# Patient Record
Sex: FEMALE | Race: WHITE | URBAN - METROPOLITAN AREA
[De-identification: names, ages, dates, MRNs, and addresses within clinical notes are randomized per-mention and may not be internally consistent; named-entity substitution may affect disease eponyms.]

---

## 2017-09-14 ENCOUNTER — TELEPHONE (OUTPATIENT)
Dept: ORTHOPEDIC SURGERY | Age: 47
End: 2017-09-14

## 2017-09-14 ENCOUNTER — OFFICE VISIT (OUTPATIENT)
Dept: ORTHOPEDIC SURGERY | Age: 47
End: 2017-09-14

## 2017-09-14 VITALS
SYSTOLIC BLOOD PRESSURE: 152 MMHG | BODY MASS INDEX: 23.02 KG/M2 | HEART RATE: 96 BPM | HEIGHT: 68 IN | WEIGHT: 151.9 LBS | DIASTOLIC BLOOD PRESSURE: 99 MMHG

## 2017-09-14 DIAGNOSIS — S93.402A SPRAIN OF LEFT ANKLE, UNSPECIFIED LIGAMENT, INITIAL ENCOUNTER: ICD-10-CM

## 2017-09-14 DIAGNOSIS — M79.672 LEFT FOOT PAIN: Primary | ICD-10-CM

## 2017-09-14 DIAGNOSIS — Z72.0 TOBACCO ABUSE: ICD-10-CM

## 2017-09-14 DIAGNOSIS — S92.355A CLOSED NONDISPLACED FRACTURE OF FIFTH METATARSAL BONE OF LEFT FOOT, INITIAL ENCOUNTER: ICD-10-CM

## 2017-09-14 DIAGNOSIS — S93.602A FOOT SPRAIN, LEFT, INITIAL ENCOUNTER: ICD-10-CM

## 2017-09-14 PROBLEM — S93.609A FOOT SPRAIN: Status: ACTIVE | Noted: 2017-09-14

## 2017-09-14 PROCEDURE — 99203 OFFICE O/P NEW LOW 30 MIN: CPT | Performed by: FAMILY MEDICINE

## 2017-09-14 PROCEDURE — 29405 APPL SHORT LEG CAST: CPT | Performed by: FAMILY MEDICINE

## 2017-09-14 PROCEDURE — L3260 AMBULATORY SURGICAL BOOT EAC: HCPCS | Performed by: FAMILY MEDICINE

## 2017-09-14 RX ORDER — TRAMADOL HYDROCHLORIDE 50 MG/1
TABLET ORAL
Qty: 60 TABLET | Refills: 0 | Status: SHIPPED | OUTPATIENT
Start: 2017-09-14

## 2017-09-14 RX ORDER — IBUPROFEN 800 MG/1
800 TABLET ORAL 3 TIMES DAILY
Qty: 90 TABLET | Refills: 3 | Status: SHIPPED | OUTPATIENT
Start: 2017-09-14

## 2017-09-28 ENCOUNTER — OFFICE VISIT (OUTPATIENT)
Dept: ORTHOPEDIC SURGERY | Age: 47
End: 2017-09-28

## 2017-09-28 VITALS
SYSTOLIC BLOOD PRESSURE: 130 MMHG | HEART RATE: 94 BPM | BODY MASS INDEX: 23.02 KG/M2 | DIASTOLIC BLOOD PRESSURE: 79 MMHG | HEIGHT: 68 IN | WEIGHT: 151.9 LBS

## 2017-09-28 DIAGNOSIS — S93.402D SPRAIN OF LEFT ANKLE, UNSPECIFIED LIGAMENT, SUBSEQUENT ENCOUNTER: ICD-10-CM

## 2017-09-28 DIAGNOSIS — S92.355A CLOSED NONDISPLACED FRACTURE OF FIFTH METATARSAL BONE OF LEFT FOOT, INITIAL ENCOUNTER: ICD-10-CM

## 2017-09-28 DIAGNOSIS — S93.602D FOOT SPRAIN, LEFT, SUBSEQUENT ENCOUNTER: ICD-10-CM

## 2017-09-28 DIAGNOSIS — Z72.0 TOBACCO ABUSE: ICD-10-CM

## 2017-09-28 DIAGNOSIS — M79.672 LEFT FOOT PAIN: Primary | ICD-10-CM

## 2017-09-28 PROCEDURE — 99213 OFFICE O/P EST LOW 20 MIN: CPT | Performed by: FAMILY MEDICINE

## 2017-09-28 PROCEDURE — 26600 TREAT METACARPAL FRACTURE: CPT | Performed by: FAMILY MEDICINE

## 2017-09-28 RX ORDER — IBUPROFEN 200 MG
800 TABLET ORAL EVERY 6 HOURS PRN
Qty: 90 TABLET | Refills: 0 | Status: SHIPPED | OUTPATIENT
Start: 2017-09-28 | End: 2017-10-19 | Stop reason: SDUPTHER

## 2017-09-28 RX ORDER — TRAMADOL HYDROCHLORIDE 50 MG/1
50 TABLET ORAL EVERY 6 HOURS PRN
Qty: 30 TABLET | Refills: 0 | Status: SHIPPED | OUTPATIENT
Start: 2017-09-28 | End: 2017-10-08

## 2017-10-05 ENCOUNTER — OFFICE VISIT (OUTPATIENT)
Dept: ORTHOPEDIC SURGERY | Age: 47
End: 2017-10-05

## 2017-10-05 DIAGNOSIS — S93.402D SPRAIN OF LEFT ANKLE, UNSPECIFIED LIGAMENT, SUBSEQUENT ENCOUNTER: ICD-10-CM

## 2017-10-05 DIAGNOSIS — M79.672 LEFT FOOT PAIN: Primary | ICD-10-CM

## 2017-10-05 DIAGNOSIS — Z72.0 TOBACCO ABUSE: ICD-10-CM

## 2017-10-05 DIAGNOSIS — S92.355D CLOSED NONDISPLACED FRACTURE OF FIFTH METATARSAL BONE OF LEFT FOOT WITH ROUTINE HEALING, SUBSEQUENT ENCOUNTER: ICD-10-CM

## 2017-10-05 PROBLEM — S92.309A CLOSED FRACTURE OF METATARSAL BONE: Status: ACTIVE | Noted: 2017-10-05

## 2017-10-05 PROCEDURE — 29425 APPL SHORT LEG CAST WALKING: CPT | Performed by: FAMILY MEDICINE

## 2017-10-05 PROCEDURE — 99024 POSTOP FOLLOW-UP VISIT: CPT | Performed by: FAMILY MEDICINE

## 2017-10-05 NOTE — MR AVS SNAPSHOT
After Visit Summary             Erin Anderson   10/5/2017 3:15 PM   Appointment    Description:  Female : 1970   Provider:  Amee Arora MD   Department:  [facility] [address]               Your Follow-Up and Future Appointments         Below is a list of your follow-up and future appointments. This may not be a complete list as you may have made appointments directly with providers that we are not aware of or your providers may have made some for you. Please call your providers to confirm appointments. It is important to keep your appointments. Please bring your current insurance card, photo ID, co-pay, and all medication bottles to your appointment. If self-pay, payment is expected at the time of service. Your To-Do List     Future Appointments Provider Department Dept Phone    10/19/2017 1:45 PM Amee Arora MD [facility]  [phone]    Please arrive 15 minutes prior to appointment, bring photo ID and insurance card. Information from Your Visit        Department     Name Address Phone Fax    [address] [place] [address] [address]  [address] [place] [address] [phone]      Vital Signs     Last Menstrual Period Smoking Status                2017 Current Every Day Smoker             Medications and Orders      Your Current Medications Are              traMADol (ULTRAM) 50 MG tablet Take 1 tablet by mouth every 6 hours as needed for Pain    ibuprofen (ADVIL) 200 MG tablet Take 4 tablets by mouth every 6 hours as needed for Pain    ibuprofen (ADVIL;MOTRIN) 800 MG tablet Take 1 tablet by mouth 3 times daily    traMADol (ULTRAM) 50 MG tablet TAKE 1 TABLET BY MOUTH EVERY 6-8 HOURS AS NEEDED FOR PAIN. Misc.  Devices MISC ROLL A BOUT      Allergies              Codeine     Tylenol [Acetaminophen]     HURTS MY STOMACH         Additional Information        Basic Information     Date Of Birth Sex Race Ethnicity Preferred Language later time if you forget your password. 8. Enter your e-mail address. You will receive e-mail notification when new information is available in [address] 9. Click Sign Up. You can now view your medical record. Additional Information  If you have questions, please contact the physician practice where you receive care. Remember, [email or web address] is NOT to be used for urgent needs. For medical emergencies, dial 911. For questions regarding your [email or web address] account call [phone]. If you have a clinical question, please call your doctor's office.

## 2017-10-05 NOTE — PROGRESS NOTES
fracture healing were discussed for 5-7 minutes. She will need 3 view foot films through the cast next week and we will get her roll about. This dictation was performed with a verbal recognition program (DRAGON) and it was checked for errors. It is possible that there are still dictated errors within this office note. If so, please bring any errors to my attention for an addendum. All efforts were made to ensure that this office note is accurate.

## 2017-10-19 ENCOUNTER — TELEPHONE (OUTPATIENT)
Dept: ORTHOPEDIC SURGERY | Age: 47
End: 2017-10-19

## 2017-10-19 ENCOUNTER — OFFICE VISIT (OUTPATIENT)
Dept: ORTHOPEDIC SURGERY | Age: 47
End: 2017-10-19

## 2017-10-19 VITALS
HEART RATE: 67 BPM | BODY MASS INDEX: 23.49 KG/M2 | DIASTOLIC BLOOD PRESSURE: 80 MMHG | SYSTOLIC BLOOD PRESSURE: 132 MMHG | WEIGHT: 155 LBS | HEIGHT: 68 IN

## 2017-10-19 DIAGNOSIS — S93.402D SPRAIN OF LEFT ANKLE, UNSPECIFIED LIGAMENT, SUBSEQUENT ENCOUNTER: ICD-10-CM

## 2017-10-19 DIAGNOSIS — S92.355A CLOSED NONDISPLACED FRACTURE OF FIFTH METATARSAL BONE OF LEFT FOOT, INITIAL ENCOUNTER: Primary | ICD-10-CM

## 2017-10-19 DIAGNOSIS — M79.672 LEFT FOOT PAIN: ICD-10-CM

## 2017-10-19 PROCEDURE — 99024 POSTOP FOLLOW-UP VISIT: CPT | Performed by: FAMILY MEDICINE

## 2017-10-19 PROCEDURE — L1902 AFO ANKLE GAUNTLET PRE OTS: HCPCS | Performed by: FAMILY MEDICINE

## 2017-10-19 NOTE — PROGRESS NOTES
Chief Complaint  Foot Pain (follow up on left foot, cast off and x-rays today)    Follow-up left lateral midfoot and ankle pain status post inversion left 5th metatarsal base fracture     History of Present Illness:  Harika Felix is a 49 y.o. female, who is a white female one pack per day smoker who does take care and is raising her 3 grandchildren with the youngest being 3years old he should have DrWero Frye is being seen today upon referral from her severe evaluation of an acute injury to her left foot and ankle. Apparently on 9/5/2017 she was walking and stepped on an uneven portion of concrete sustaining a rotational inversion injury about her left foot and ankle. There is a palpable time the injury followed by progressive worsening of pain and swelling. She initially treated herself with icing and ibuprofen but due to persistence of pain and swelling and inability to bear weight, she was seen at [facility] on 9/6/2017. X-rays did reveal a comminuted fracture to the base of the left 5th metatarsal.  She was placed in a posterior splint and given crutches. She was given ibuprofen , was a little bit cautious on taking tramadol given her history of sensitivities to codeine which causes a rash and GI upset. She has had pain ranges between a 3-4/10 at rest with sharper 7-8/10 pain with attempting to bear weight. She is having difficulty utilizing crutches. She is having mature for pain to the lateral mid foot but also pain to the lateral aspect of the ankle. She has been attempting to ice and elevate and rest as much as she can. Denies locking catching instability or previous history of injury. She is seen today for orthopedic and sports consultation. She was seen in the office on 9/14/2017 was placed in a short-leg walking cast for her left midfoot and ankle sprain and 5th metatarsal base comminuted fracture.   She has had a very difficult time taking care of her grandchildren limiting her weightbearing does continue to smoke. Overall she would rate her improvement about 40%. She is able to bear weight without crutches and cast.  Denies locking catching or distal moderate symptoms. She does take her ibuprofen but still has to take tramadol occasionally at night. She is here for an x-ray check. She is now 23 days out from her injury. Her cast has become loose and she did get up but several days ago    She was seen in the office on 9/28/2017 was continued in her short-leg walking cast.  Overall she is feeling somewhat better but got her cast wet and the heel of her cast has been breaking down and coming soft. She is a very difficult time remaining nonweightbearing and does continue to smoke. She would rate her improvement about 50%. She has been taking her ibuprofen but is cutting back on her tramadol. She is now 30 days out from her injury and does have a scheduled appointment next week. She was seen in the office on 10/5/2017 is now 6 Weeks out from her left foot and ankle sprain with 5th metatarsal base fracture. She is doing much better at this point. She is 90% improved and does have the expected post mobilization stiffness and weakness. Denies locking or catching. She is not having rest or night pain. She has been attempting to minimize her bearing when she has had a foot her foot she is doing recently well. She is taking ibuprofen only episodically. Medical History  Patient's medications, allergies, past medical, surgical, social and family histories were reviewed and updated as appropriate. Review of Systems  Pertinent items are noted in HPI  Review of systems reviewed from Patient History Form dated on 9/14/2017 and available in the patient's chart under the Media tab.      Vital Signs  Vitals:    10/19/17 1402   BP: 132/80   Pulse: 67       General Exam:     Constitutional: Patient is adequately groomed with no evidence of malnutrition  DTRs: Deep tendon reflexes are intact  Mental Status: The patient is oriented to time, place and person. The patient's mood and affect are appropriate. Lymphatic: The lymphatic examination bilaterally reveals all areas to be without enlargement or induration. Vascular: Examination reveals no swelling or calf tenderness. Peripheral pulses are palpable and 2+. Neurological: The patient has good coordination. There is no weakness or sensory deficit. Foot  Examination  Inspection:  There is no high-grade deformity she does have resolving lateral midfoot and ankle swelling with resolving lateral ecchymosis. Palpation:  She Does not have substantial clinical tenderness over the base of the 5th metatarsal as well as over the peroneal brevis tendon. She has no further lateral tarsometatarsal and low-grade Lisfranc tenderness. Lateral ligamentous ankle tenderness also noted. Rang of Motion:  30 % reduction and foot motion. Strength:  She does have 4 out of 5 strength loss with resisted eversion and dorsiflexion. Special Tests:  Negative special testing. No evidence of peroneal subluxation. Skin: There are no rashes, ulcerations or lesions. Gait: She was able to bear weight out of her cast without substantial pain. .     Reflex symmetrically preserved. Additional Comments: Left ankle evaluation does reveal tenderness over the Machado ligament. There is trace residual swelling laterally with lateral ecchymosis. No osseous tenderness. She has about a 50% reduction of ankle motion and weakness with eversion dorsiflexion 4 minus out of 5 secondary to pain at the ankle but primarily pain involving the base of the left 5th metatarsal at her fracture site. Negative anterior drawer talar tilt and tonsils testing. No medial tenderness. No Lisfranc tenderness. Additional Examinations:  Contralateral Exam: Examination of the right ankle reveals intact skin. There is no focal tenderness or swelling.   The patient demonstrates therapy. This dictation was performed with a verbal recognition program (DRAGON) and it was checked for errors. It is possible that there are still dictated errors within this office note. If so, please bring any errors to my attention for an addendum. All efforts were made to ensure that this office note is accurate.

## 2017-11-08 ENCOUNTER — HOSPITAL ENCOUNTER (OUTPATIENT)
Dept: OTHER | Age: 47
Discharge: HOME | End: 2017-11-30
Attending: FAMILY MEDICINE | Admitting: FAMILY MEDICINE

## 2017-12-01 ENCOUNTER — HOSPITAL ENCOUNTER (OUTPATIENT)
Dept: OTHER | Age: 47
Discharge: HOME | End: 2017-12-31
Attending: FAMILY MEDICINE | Admitting: FAMILY MEDICINE

## 2023-09-18 PROBLEM — R00.2 PALPITATIONS: Status: ACTIVE | Noted: 2023-09-18

## 2023-10-31 DIAGNOSIS — R76.8 HEPATITIS C ANTIBODY TEST POSITIVE: ICD-10-CM

## 2023-11-03 LAB
HCV RNA SERPL NAA+PROBE-ACNC: ABNORMAL IU/ML
HCV RNA SERPL NAA+PROBE-LOG IU: 6.94 LOG IU/ML
HCV RNA SERPL QL NAA+PROBE: DETECTED

## 2024-02-20 ENCOUNTER — HOSPITAL ENCOUNTER (OUTPATIENT)
Dept: CT IMAGING | Age: 54
Discharge: HOME | End: 2024-02-20
Payer: MEDICAID

## 2024-02-20 ENCOUNTER — HOSPITAL ENCOUNTER (OUTPATIENT)
Dept: WOMENS IMAGING | Age: 54
Discharge: HOME | End: 2024-02-20
Payer: MEDICAID

## 2024-02-20 VITALS — HEIGHT: 67 IN | BODY MASS INDEX: 25.9 KG/M2 | WEIGHT: 165 LBS

## 2024-02-20 DIAGNOSIS — Z87.891 PERSONAL HISTORY OF TOBACCO USE: ICD-10-CM

## 2024-02-20 DIAGNOSIS — Z12.31 ENCOUNTER FOR SCREENING MAMMOGRAM FOR MALIGNANT NEOPLASM OF BREAST: ICD-10-CM

## 2024-02-20 DIAGNOSIS — Z00.00 ANNUAL PHYSICAL EXAM: ICD-10-CM

## 2024-02-20 LAB
ALBUMIN SERPL-MCNC: 4.1 G/DL (ref 3.4–5)
ALBUMIN/GLOB SERPL: 1.2 {RATIO} (ref 1.1–2.2)
ALP SERPL-CCNC: 93 U/L (ref 40–129)
ALT SERPL-CCNC: 26 U/L (ref 10–40)
ANION GAP SERPL CALCULATED.3IONS-SCNC: 11 MMOL/L (ref 3–16)
AST SERPL-CCNC: 29 U/L (ref 15–37)
BILIRUB SERPL-MCNC: 0.4 MG/DL (ref 0–1)
BUN SERPL-MCNC: 13 MG/DL (ref 7–20)
CALCIUM SERPL-MCNC: 9.7 MG/DL (ref 8.3–10.6)
CHLORIDE SERPL-SCNC: 100 MMOL/L (ref 99–110)
CO2 SERPL-SCNC: 28 MMOL/L (ref 21–32)
CREAT SERPL-MCNC: 0.7 MG/DL (ref 0.6–1.1)
DEPRECATED RDW RBC AUTO: 13.4 % (ref 12.4–15.4)
GFR SERPLBLD CREATININE-BSD FMLA CKD-EPI: >60 ML/MIN/{1.73_M2}
GLUCOSE SERPL-MCNC: 95 MG/DL (ref 70–99)
HCT VFR BLD AUTO: 44.2 % (ref 36–48)
HGB BLD-MCNC: 15.3 G/DL (ref 12–16)
MCH RBC QN AUTO: 29.2 PG (ref 26–34)
MCHC RBC AUTO-ENTMCNC: 34.6 G/DL (ref 31–36)
MCV RBC AUTO: 84.4 FL (ref 80–100)
PLATELET # BLD AUTO: 255 K/UL (ref 135–450)
PMV BLD AUTO: 8.5 FL (ref 5–10.5)
POTASSIUM SERPL-SCNC: 4.4 MMOL/L (ref 3.5–5.1)
PROT SERPL-MCNC: 7.5 G/DL (ref 6.4–8.2)
RBC # BLD AUTO: 5.23 M/UL (ref 4–5.2)
SODIUM SERPL-SCNC: 139 MMOL/L (ref 136–145)
WBC # BLD AUTO: 7.3 K/UL (ref 4–11)

## 2024-02-20 PROCEDURE — 77063 BREAST TOMOSYNTHESIS BI: CPT

## 2024-02-20 PROCEDURE — 71271 CT THORAX LUNG CANCER SCR C-: CPT

## 2024-02-21 LAB
HAV AB SER QL IA: POSITIVE
HBV CORE AB SERPL QL IA: NEGATIVE
HBV CORE IGM SERPL QL IA: NORMAL
HBV SURFACE AB SERPL IA-ACNC: <3.5 MIU/ML
HBV SURFACE AG SERPL QL IA: NORMAL
HCV AB SERPL QL IA: REACTIVE
HIV 1+2 AB+HIV1 P24 AG SERPL QL IA: NORMAL
HIV 2 AB SERPL QL IA: NORMAL
HIV1 AB SERPL QL IA: NORMAL
HIV1 P24 AG SERPL QL IA: NORMAL

## 2024-02-23 LAB
HCV RNA SERPL NAA+PROBE-ACNC: ABNORMAL IU/ML
HCV RNA SERPL NAA+PROBE-LOG IU: 7.33 LOG IU/ML
HCV RNA SERPL QL NAA+PROBE: DETECTED

## 2024-04-29 PROBLEM — S93.402A SPRAIN OF LEFT ANKLE: Status: RESOLVED | Noted: 2017-09-14 | Resolved: 2024-04-29

## 2024-04-29 PROBLEM — K21.9 MILD ACID REFLUX: Status: ACTIVE | Noted: 2024-04-29

## 2024-04-29 PROBLEM — S92.355A CLOSED NONDISPLACED FRACTURE OF FIFTH METATARSAL BONE OF LEFT FOOT: Status: RESOLVED | Noted: 2017-09-14 | Resolved: 2024-04-29

## 2024-04-29 PROBLEM — R91.1 PULMONARY NODULE: Status: ACTIVE | Noted: 2024-04-29

## 2024-04-29 PROBLEM — S92.309A CLOSED FRACTURE OF METATARSAL BONE: Status: RESOLVED | Noted: 2017-10-05 | Resolved: 2024-04-29

## 2024-04-29 PROBLEM — M79.672 LEFT FOOT PAIN: Status: RESOLVED | Noted: 2017-09-14 | Resolved: 2024-04-29

## 2024-04-29 PROBLEM — J43.9 PULMONARY EMPHYSEMA (HCC): Status: ACTIVE | Noted: 2024-04-29

## 2024-04-29 PROBLEM — J30.2 SEASONAL ALLERGIES: Status: ACTIVE | Noted: 2024-04-29

## 2024-04-29 PROBLEM — F51.01 PRIMARY INSOMNIA: Status: ACTIVE | Noted: 2024-04-29

## 2024-05-20 ENCOUNTER — HOSPITAL ENCOUNTER (EMERGENCY)
Age: 54
Discharge: HOME | End: 2024-05-20
Attending: EMERGENCY MEDICINE
Payer: MEDICAID

## 2024-05-20 VITALS
WEIGHT: 172.62 LBS | TEMPERATURE: 98.8 F | RESPIRATION RATE: 24 BRPM | SYSTOLIC BLOOD PRESSURE: 141 MMHG | BODY MASS INDEX: 27.09 KG/M2 | DIASTOLIC BLOOD PRESSURE: 83 MMHG | HEART RATE: 105 BPM | HEIGHT: 67 IN | OXYGEN SATURATION: 95 %

## 2024-05-20 DIAGNOSIS — K04.7 PERIAPICAL ABSCESS WITHOUT SINUS TRACT: Primary | ICD-10-CM

## 2024-05-20 PROCEDURE — 99283 EMERGENCY DEPT VISIT LOW MDM: CPT

## 2024-05-20 RX ORDER — PENICILLIN V POTASSIUM 500 MG/1
500 TABLET ORAL 4 TIMES DAILY
Qty: 40 TABLET | Refills: 0 | Status: SHIPPED | OUTPATIENT
Start: 2024-05-20 | End: 2024-05-30

## 2024-05-20 ASSESSMENT — PAIN - FUNCTIONAL ASSESSMENT
PAIN_FUNCTIONAL_ASSESSMENT: 0-10
PAIN_FUNCTIONAL_ASSESSMENT: 0-10

## 2024-05-20 ASSESSMENT — PAIN DESCRIPTION - ORIENTATION: ORIENTATION: LEFT;UPPER

## 2024-05-20 ASSESSMENT — PAIN SCALES - GENERAL
PAINLEVEL_OUTOF10: 4
PAINLEVEL_OUTOF10: 4

## 2024-05-20 ASSESSMENT — PAIN DESCRIPTION - DESCRIPTORS: DESCRIPTORS: ACHING

## 2024-05-20 ASSESSMENT — PAIN DESCRIPTION - LOCATION: LOCATION: TEETH

## 2024-05-20 NOTE — ED TRIAGE NOTES
Pt. C/o abscess left upper tooth onset Friday, has appointment 6/4/24 with dentist, swelling left jaw

## 2024-05-20 NOTE — DISCHARGE INSTRUCTIONS
1.  Medications as directed and Tylenol or ibuprofen over-the-counter for pain.  2 You can use ice packs for the facial swelling  #3 follow-up with dentist or oral surgeon as soon as possible.  4.  Return emergency department for severe high fever severe signs of infection or inability to tolerate oral fluid

## 2024-05-31 NOTE — ED PROVIDER NOTES
EMERGENCY DEPARTMENT ENCOUNTER     [facility]     Pt Name: Nancy Lewis   MRN: [id number]   Birthdate 1970   Date of evaluation: 5/20/2024   Provider: Ronald Cortes MD   PCP: Dahlia Childs PA   Note Started: 7:03 AM EDT 5/31/24     CHIEF COMPLAINT     Chief Complaint   Patient presents with    Oral Swelling     Pt. C/o abscess left upper tooth onset Friday, has appointment 6/4/24 with dentist, swelling left jaw        HISTORY OF PRESENT ILLNESS:  History from : Patient   Limitations to history : None     Nancy Lewis is a 54 y.o. female who presents with toothache and facial swelling.  This is a 54-year-old female with migratory past medical history who has had dental pain and issues with the left maxillary canine for couple of weeks.  She is developed some increased pain and swelling over her face which is associated with this tooth but has not noticed any drainage.  No fevers chills or systemic signs of infection she has a follow-up appoint with a dentist on June 4.    Nursing Notes were all reviewed and agreed with or any disagreements were addressed in the HPI.     ROS: Positives and Pertinent negatives as per HPI.    PAST MEDICAL HISTORY     Past medical history:  has a past medical history of Anxiety, Closed fracture of metatarsal bone (10/05/2017), Closed nondisplaced fracture of fifth metatarsal bone of left foot (09/14/2017), Depression, GERD (gastroesophageal reflux disease), Left foot pain (09/14/2017), Seasonal allergies, and Sprain of left ankle (09/14/2017).    Past surgical history:  has a past surgical history that includes Tubal ligation and Nose surgery.      PHYSICAL EXAM:  ED Triage Vitals [05/20/24 1723]   BP Temp Temp src Pulse Respirations SpO2 Height Weight - Scale   (!) 141/83 98.8 °F (37.1 °C) -- (!) 105 24 95 % 1.702 m (5' 7\") 78.3 kg (172 lb 9.9 oz)        Physical Exam  Constitutional:       Appearance: She is not ill-appearing.   HENT:

## 2025-01-25 ENCOUNTER — TELEPHONE (OUTPATIENT)
Dept: CASE MANAGEMENT | Age: 55
End: 2025-01-25

## 2025-03-23 ENCOUNTER — TELEPHONE (OUTPATIENT)
Dept: CASE MANAGEMENT | Age: 55
End: 2025-03-23

## 2025-04-23 ENCOUNTER — TELEPHONE (OUTPATIENT)
Dept: CASE MANAGEMENT | Age: 55
End: 2025-04-23